# Patient Record
Sex: MALE | Race: WHITE | URBAN - METROPOLITAN AREA
[De-identification: names, ages, dates, MRNs, and addresses within clinical notes are randomized per-mention and may not be internally consistent; named-entity substitution may affect disease eponyms.]

---

## 2022-02-14 ENCOUNTER — OFFICE VISIT (OUTPATIENT)
Dept: URBAN - METROPOLITAN AREA CLINIC 128 | Facility: CLINIC | Age: 72
End: 2022-02-14
Payer: MEDICARE

## 2022-02-14 DIAGNOSIS — K56.7 ILEUS: ICD-10-CM

## 2022-02-14 DIAGNOSIS — R93.89 ABNORMAL CT SCAN: ICD-10-CM

## 2022-02-14 DIAGNOSIS — Z86.010 PERSONAL HISTORY OF COLONIC POLYPS: ICD-10-CM

## 2022-02-14 DIAGNOSIS — Z85.46 HISTORY OF PROSTATE CANCER: ICD-10-CM

## 2022-02-14 DIAGNOSIS — D72.829 LEUKOCYTOSIS, UNSPECIFIED TYPE: ICD-10-CM

## 2022-02-14 DIAGNOSIS — K22.70 BARRETT'S ESOPHAGUS WITHOUT DYSPLASIA: ICD-10-CM

## 2022-02-14 PROCEDURE — 99204 OFFICE O/P NEW MOD 45 MIN: CPT | Performed by: PHYSICIAN ASSISTANT

## 2022-02-14 RX ORDER — UPADACITINIB 15 MG/1
1 TABLET TABLET, EXTENDED RELEASE ORAL ONCE A DAY
Status: ACTIVE | COMMUNITY

## 2022-02-14 NOTE — HPI-TODAY'S VISIT:
The patient is a new patient who is here for a hopsital follow up visit. He went to [facility] on 2/10/22 for band-like mid abdominal pain x 1 night. He had had a recent colonoscopy which showed some colon polyps. He took pepto bismol withoout relied He had nausea with no vomiting or diarrhea. He has known RA and GERD. He has a history of prostate cancer.  WBC 13.8. LFTs normal. He was diagnosed with having an ileus and told to see a GI for outoatient management. His abdominal and pelvic CT scan revealed diffuse small bowel dilatation with borderline wall thickening involving the few small bowel loops of RLQ, nonspecific and could be due to ileus, could consider enteritis and intestinal obstruction was less likely. Wall thickening of  bladder was noted. Cystic foci in kidneys noted and nonemergent MRI was recommended. Some sclerotic foci invlving the bones, likely nonaggressive however given history of prostate cancer a nonemergent bone scan with SPECT imaging was advised. He was placed on a soft liquid diet. Currently, the abdominal pain is gone now and he has advanced to a solid diet. Last EGD was done in 2018 and he was told he may have Barretts esophagus and his last colonoscopy: 2018 and these were done through GI specialist and path reports were requested to review. He has had hernia surgery, a cholecystetcomy, and a prostatectomy from his previous prostate cancer. His urologist is Dr. Garcia from [facility].

## 2022-02-16 LAB
BASO (ABSOLUTE): 0
BASOS: 0
DEAMIDATED GLIADIN ABS, IGA: 5
DEAMIDATED GLIADIN ABS, IGG: 2
ENDOMYSIAL ANTIBODY IGA: NEGATIVE
EOS (ABSOLUTE): 0
EOS: 0
HEMATOCRIT: 46.6
HEMATOLOGY COMMENTS:: (no result)
HEMOGLOBIN: 15.5
IMMATURE CELLS: (no result)
IMMATURE GRANS (ABS): 0
IMMATURE GRANULOCYTES: 0
IMMUNOGLOBULIN A, QN, SERUM: 289
LIPASE: 34
LYMPHS (ABSOLUTE): 1.1
LYMPHS: 20
MCH: 31.2
MCHC: 33.3
MCV: 94
MONOCYTES(ABSOLUTE): 0.5
MONOCYTES: 9
NEUTROPHILS (ABSOLUTE): 3.7
NEUTROPHILS: 71
NRBC: (no result)
PLATELETS: 222
RBC: 4.97
RDW: 13.1
T-TRANSGLUTAMINASE (TTG) IGA: <2
T-TRANSGLUTAMINASE (TTG) IGG: <2
WBC: 5.4

## 2022-02-21 ENCOUNTER — TELEPHONE ENCOUNTER (OUTPATIENT)
Dept: URBAN - METROPOLITAN AREA CLINIC 128 | Facility: CLINIC | Age: 72
End: 2022-02-21

## 2022-02-21 ENCOUNTER — TELEPHONE ENCOUNTER (OUTPATIENT)
Dept: URBAN - METROPOLITAN AREA CLINIC 92 | Facility: CLINIC | Age: 72
End: 2022-02-21

## 2022-04-11 ENCOUNTER — OFFICE VISIT (OUTPATIENT)
Dept: URBAN - METROPOLITAN AREA CLINIC 128 | Facility: CLINIC | Age: 72
End: 2022-04-11
Payer: MEDICARE

## 2022-04-11 ENCOUNTER — WEB ENCOUNTER (OUTPATIENT)
Dept: URBAN - METROPOLITAN AREA CLINIC 128 | Facility: CLINIC | Age: 72
End: 2022-04-11

## 2022-04-11 DIAGNOSIS — Z85.46 HISTORY OF PROSTATE CANCER: ICD-10-CM

## 2022-04-11 DIAGNOSIS — R11.0 NAUSEA: ICD-10-CM

## 2022-04-11 DIAGNOSIS — K56.7 ILEUS: ICD-10-CM

## 2022-04-11 DIAGNOSIS — R93.89 ABNORMAL CT SCAN: ICD-10-CM

## 2022-04-11 DIAGNOSIS — K22.70 BARRETT'S ESOPHAGUS WITHOUT DYSPLASIA: ICD-10-CM

## 2022-04-11 DIAGNOSIS — D72.829 LEUKOCYTOSIS, UNSPECIFIED TYPE: ICD-10-CM

## 2022-04-11 DIAGNOSIS — Z86.010 PERSONAL HISTORY OF COLONIC POLYPS: ICD-10-CM

## 2022-04-11 PROCEDURE — 99213 OFFICE O/P EST LOW 20 MIN: CPT | Performed by: PHYSICIAN ASSISTANT

## 2022-04-11 RX ORDER — UPADACITINIB 15 MG/1
1 TABLET TABLET, EXTENDED RELEASE ORAL ONCE A DAY
Status: ACTIVE | COMMUNITY

## 2022-04-11 NOTE — HPI-TODAY'S VISIT:
The patient is here for a follow up visit. Previously, he was here for a hopsital follow up visit. He went to [facility] on 2/10/22 for band-like mid abdominal pain x 1 night. He had had a recent colonoscopy which showed some colon polyps. He took pepto bismol without relief. He had nausea with no vomiting or diarrhea. He has known RA and GERD. He has a history of prostate cancer.  His prior WBC was 13.8 but it is normal now at 5. LFTs normal. He was diagnosed with having an ileus and told to see a GI for outpatient management. His abdominal and pelvic CT scan revealed diffuse small bowel dilatation with borderline wall thickening involving the few small bowel loops of RLQ, nonspecific and could be due to ileus, could consider enteritis and intestinal obstruction was less likely. Wall thickening of  bladder was noted. Cystic foci in kidneys noted and nonemergent MRI was recommended. Some sclerotic foci invlving the bones, likely nonaggressive however given history of prostate cancer a nonemergent bone scan with SPECT imaging was advised. He was placed on a soft liquid diet. Currently, the abdominal pain is gone now and he has advanced to a solid diet. Last EGD was done in 2018 and he was told he may have Barretts esophagus and his last colonoscopy: 2018 and these were done through GI specialist and path reports were requested to review. He has had hernia surgery, a cholecystetcomy, and a prostatectomy from his previous prostate cancer. His urologist is Dr. Garcia from [facility]. He has his egd and colonoscopy scheduled for 6/7/2022. His 3/7/22 CTE revealed interval resolution of the previously demonstrated small bowel dilatation and mural hyperenhancement. Diverticulosis was noted. Stable inderteminate left renal hyperdensity which could be a complex hemorrhagic or proteinaceous cyst but is not completely characterized so a renal mass protocol MRI was recommedned and mild circumferential bladder wall thickening was noted which has improved compared to prior study and likely accentuated by underdistention. His recent bone scan revealed low bone mass.

## 2022-05-31 ENCOUNTER — TELEPHONE ENCOUNTER (OUTPATIENT)
Dept: URBAN - METROPOLITAN AREA CLINIC 128 | Facility: CLINIC | Age: 72
End: 2022-05-31

## 2022-06-07 ENCOUNTER — OFFICE VISIT (OUTPATIENT)
Dept: URBAN - METROPOLITAN AREA SURGERY CENTER 31 | Facility: SURGERY CENTER | Age: 72
End: 2022-06-07
Payer: MEDICARE

## 2022-06-07 DIAGNOSIS — D12.5 ADENOMA OF SIGMOID COLON: ICD-10-CM

## 2022-06-07 DIAGNOSIS — K31.89 ACQUIRED DEFORMITY OF DUODENUM: ICD-10-CM

## 2022-06-07 DIAGNOSIS — Z86.010 ADENOMAS PERSONAL HISTORY OF COLONIC POLYPS: ICD-10-CM

## 2022-06-07 DIAGNOSIS — K22.70 BARRETT ESOPHAGUS: ICD-10-CM

## 2022-06-07 PROCEDURE — 43239 EGD BIOPSY SINGLE/MULTIPLE: CPT | Performed by: INTERNAL MEDICINE

## 2022-06-07 PROCEDURE — G8907 PT DOC NO EVENTS ON DISCHARG: HCPCS | Performed by: INTERNAL MEDICINE

## 2022-06-07 PROCEDURE — 45385 COLONOSCOPY W/LESION REMOVAL: CPT | Performed by: INTERNAL MEDICINE

## 2022-08-31 ENCOUNTER — TELEPHONE ENCOUNTER (OUTPATIENT)
Dept: URBAN - METROPOLITAN AREA CLINIC 92 | Facility: CLINIC | Age: 72
End: 2022-08-31

## 2022-10-10 ENCOUNTER — OFFICE VISIT (OUTPATIENT)
Dept: URBAN - METROPOLITAN AREA CLINIC 128 | Facility: CLINIC | Age: 72
End: 2022-10-10

## 2022-10-11 ENCOUNTER — CLAIMS CREATED FROM THE CLAIM WINDOW (OUTPATIENT)
Dept: URBAN - METROPOLITAN AREA CLINIC 128 | Facility: CLINIC | Age: 72
End: 2022-10-11
Payer: MEDICARE

## 2022-10-11 ENCOUNTER — WEB ENCOUNTER (OUTPATIENT)
Dept: URBAN - METROPOLITAN AREA CLINIC 128 | Facility: CLINIC | Age: 72
End: 2022-10-11

## 2022-10-11 VITALS
WEIGHT: 153 LBS | BODY MASS INDEX: 20.72 KG/M2 | HEART RATE: 74 BPM | HEIGHT: 72 IN | DIASTOLIC BLOOD PRESSURE: 92 MMHG | SYSTOLIC BLOOD PRESSURE: 152 MMHG | TEMPERATURE: 97.8 F

## 2022-10-11 DIAGNOSIS — D72.829 LEUKOCYTOSIS, UNSPECIFIED TYPE: ICD-10-CM

## 2022-10-11 DIAGNOSIS — Z86.010 PERSONAL HISTORY OF COLONIC POLYPS: ICD-10-CM

## 2022-10-11 DIAGNOSIS — K22.70 BARRETT'S ESOPHAGUS WITHOUT DYSPLASIA: ICD-10-CM

## 2022-10-11 DIAGNOSIS — Z85.46 HISTORY OF PROSTATE CANCER: ICD-10-CM

## 2022-10-11 DIAGNOSIS — R93.89 ABNORMAL CT SCAN: ICD-10-CM

## 2022-10-11 DIAGNOSIS — R11.0 NAUSEA: ICD-10-CM

## 2022-10-11 DIAGNOSIS — K56.7 ILEUS: ICD-10-CM

## 2022-10-11 DIAGNOSIS — R97.20 ELEVATED PSA: ICD-10-CM

## 2022-10-11 PROBLEM — 428262008: Status: ACTIVE | Noted: 2022-02-14

## 2022-10-11 PROBLEM — 428283002: Status: ACTIVE | Noted: 2022-02-14

## 2022-10-11 PROBLEM — 129679001: Status: ACTIVE | Noted: 2022-02-14

## 2022-10-11 PROBLEM — 111583006: Status: ACTIVE | Noted: 2022-02-14

## 2022-10-11 PROBLEM — 302914006: Status: ACTIVE | Noted: 2022-02-14

## 2022-10-11 PROCEDURE — 99213 OFFICE O/P EST LOW 20 MIN: CPT | Performed by: PHYSICIAN ASSISTANT

## 2022-10-11 RX ORDER — UPADACITINIB 15 MG/1
1 TABLET TABLET, EXTENDED RELEASE ORAL ONCE A DAY
Status: ACTIVE | COMMUNITY

## 2022-10-11 RX ORDER — ESOMEPRAZOLE MAGNESIUM 40 MG/1
1 CAPSULE CAPSULE, DELAYED RELEASE ORAL ONCE A DAY
Status: ACTIVE | COMMUNITY

## 2022-10-11 NOTE — HPI-TODAY'S VISIT:
The patient is here for a follow up visit. Previously, he was here for a hopsital follow up visit. He went to [facility] on 2/10/22 for band-like mid abdominal pain x 1 night. He had had a recent colonoscopy which showed some colon polyps. He took pepto bismol without relief. He had nausea with no vomiting or diarrhea. He has known RA and GERD. He has a history of prostate cancer.  His prior WBC was 13.8 but it is normal now at 5. LFTs normal. He was diagnosed with having an ileus and told to see a GI for outpatient management. His abdominal and pelvic CT scan revealed diffuse small bowel dilatation with borderline wall thickening involving the few small bowel loops of RLQ, nonspecific and could be due to ileus, could consider enteritis and intestinal obstruction was less likely. Wall thickening of  bladder was noted. Cystic foci in kidneys noted and nonemergent MRI was recommended. Some sclerotic foci invlving the bones, likely nonaggressive however given history of prostate cancer a nonemergent bone scan with SPECT imaging was advised. He was placed on a soft liquid diet. Currently, the abdominal pain is gone now and he has advanced to a solid diet. Last EGD was done in 2018 and he was told he may have Barretts esophagus and his last colonoscopy: 2018 and these were done through GI specialist and path reports were requested to review. He has had hernia surgery, a cholecystetcomy, and a prostatectomy from his previous prostate cancer. His urologist is Dr. Garcia from [facility]. He has his egd and colonoscopy scheduled for 6/7/2022. His 3/7/22 CTE revealed interval resolution of the previously demonstrated small bowel dilatation and mural hyperenhancement. Diverticulosis was noted. Stable inderteminate left renal hyperdensity which could be a complex hemorrhagic or proteinaceous cyst but is not completely characterized so a renal mass protocol MRI was recommedned and mild circumferential bladder wall thickening was noted which has improved compared to prior study and likely accentuated by underdistention. His recent bone scan revealed low bone mass.   His 6/7/22 EGD revealed ractive gastropathy and she was positive for Barretts esophagus. 6/22 colonoscopy revealed a tubular adenoma. He states since his last office visit he had 39 rounds of radiation therapy due to his PSA being slightly elevated at 0.3 even though he has no prostate due to history of prostate cancer. His recent 5/22 PET scan revealed no metastatic adenocarcinoma of prostate cancer, Hi sleft renal cortex revealed a stable mass, likely  representing a renal cyst.

## 2023-11-02 ENCOUNTER — DASHBOARD ENCOUNTERS (OUTPATIENT)
Age: 73
End: 2023-11-02

## 2023-11-02 ENCOUNTER — OFFICE VISIT (OUTPATIENT)
Dept: URBAN - METROPOLITAN AREA CLINIC 128 | Facility: CLINIC | Age: 73
End: 2023-11-02
Payer: MEDICARE

## 2023-11-02 VITALS
TEMPERATURE: 97.8 F | SYSTOLIC BLOOD PRESSURE: 138 MMHG | DIASTOLIC BLOOD PRESSURE: 72 MMHG | BODY MASS INDEX: 20.67 KG/M2 | WEIGHT: 152.6 LBS | HEIGHT: 72 IN

## 2023-11-02 DIAGNOSIS — R93.89 ABNORMAL CT SCAN: ICD-10-CM

## 2023-11-02 DIAGNOSIS — Z86.010 PERSONAL HISTORY OF COLONIC POLYPS: ICD-10-CM

## 2023-11-02 DIAGNOSIS — K22.70 BARRETT'S ESOPHAGUS WITHOUT DYSPLASIA: ICD-10-CM

## 2023-11-02 DIAGNOSIS — D72.829 LEUKOCYTOSIS, UNSPECIFIED TYPE: ICD-10-CM

## 2023-11-02 DIAGNOSIS — K21.9 GASTROESOPHAGEAL REFLUX DISEASE WITHOUT ESOPHAGITIS: ICD-10-CM

## 2023-11-02 DIAGNOSIS — K56.7 ILEUS: ICD-10-CM

## 2023-11-02 DIAGNOSIS — R11.0 NAUSEA: ICD-10-CM

## 2023-11-02 DIAGNOSIS — Z85.46 HISTORY OF PROSTATE CANCER: ICD-10-CM

## 2023-11-02 DIAGNOSIS — R97.20 ELEVATED PSA: ICD-10-CM

## 2023-11-02 PROBLEM — 266435005: Status: ACTIVE | Noted: 2023-11-02

## 2023-11-02 PROCEDURE — 99214 OFFICE O/P EST MOD 30 MIN: CPT | Performed by: PHYSICIAN ASSISTANT

## 2023-11-02 RX ORDER — FAMOTIDINE 20 MG/1
1 TABLET AT BEDTIME AS NEEDED TABLET, FILM COATED ORAL ONCE A DAY
Qty: 30 | Refills: 5 | OUTPATIENT
Start: 2023-11-02

## 2023-11-02 RX ORDER — UPADACITINIB 15 MG/1
1 TABLET TABLET, EXTENDED RELEASE ORAL ONCE A DAY
Status: ACTIVE | COMMUNITY

## 2023-11-02 RX ORDER — ESOMEPRAZOLE MAGNESIUM 40 MG/1
1 CAPSULE CAPSULE, DELAYED RELEASE ORAL ONCE A DAY
Status: ACTIVE | COMMUNITY

## 2023-11-02 NOTE — HPI-TODAY'S VISIT:
The patient is here for a follow up visit for his annual visit for GERD. He states he has reflux when he is walking. He denies dysphagia. He went to [facility] on 2/10/22 for band-like mid abdominal pain x 1 night. He had had a recent colonoscopy which showed some colon polyps. He took pepto bismol without relief. He had no nausea or vomiting or diarrhea. He has known RA and GERD. He has a history of prostate cancer.  His prior WBC was 13.8 but it is normal now at 5. LFTs normal. He was diagnosed with having an ileus and told to see a GI for outpatient management. His abdominal and pelvic CT scan revealed diffuse small bowel dilatation with borderline wall thickening involving the few small bowel loops of RLQ, nonspecific and could be due to ileus, could consider enteritis and intestinal obstruction was less likely. Wall thickening of  bladder was noted. Cystic foci in kidneys noted and nonemergent MRI was recommended. Some sclerotic foci invlving the bones, likely nonaggressive however given history of prostate cancer a nonemergent bone scan with SPECT imaging was advised. He was placed on a soft liquid diet. Currently, the abdominal pain is gone now and he has advanced to a solid diet. Last EGD was done in 2018 and he was told he may have Barretts esophagus and his last colonoscopy: 2018 and these were done through GI specialist and path reports were requested to review. He has had hernia surgery, a cholecystetcomy, and a prostatectomy from his previous prostate cancer. His urologist is Dr. Garcia from [facility]. His 3/7/22 CTE revealed interval resolution of the previously demonstrated small bowel dilatation and mural hyperenhancement. Diverticulosis was noted. Stable inderteminate left renal hyperdensity which could be a complex hemorrhagic or proteinaceous cyst but is not completely characterized so a renal mass protocol MRI was recommedned and mild circumferential bladder wall thickening was noted which has improved compared to prior study and likely accentuated by underdistention. His recent bone scan revealed low bone mass.   His 6/7/22 EGD revealed ractive gastropathy and she was positive for Barretts esophagus. 6/22 colonoscopy revealed a tubular adenoma andboth an egd and colonoscopy are to be repeated in 3 years.  He states since his last office visit he had 39 rounds of radiation therapy due to his PSA being slightly elevated at 0.3 even though he has no prostate due to history of prostate cancer. His recent 5/22 PET scan revealed no metastatic adenocarcinoma of prostate cancer, His left renal cortex revealed a stable mass, likely  representing a renal cyst.  He has not had his kidney MRI done yet through his urologist but will get this done in 01/2024 when he sees his urologist. We fax the CT scan to his urologist office so they have this report.